# Patient Record
Sex: FEMALE | Race: BLACK OR AFRICAN AMERICAN | NOT HISPANIC OR LATINO | Employment: FULL TIME | URBAN - METROPOLITAN AREA
[De-identification: names, ages, dates, MRNs, and addresses within clinical notes are randomized per-mention and may not be internally consistent; named-entity substitution may affect disease eponyms.]

---

## 2023-06-18 ENCOUNTER — HOSPITAL ENCOUNTER (EMERGENCY)
Facility: HOSPITAL | Age: 23
Discharge: HOME | End: 2023-06-18
Attending: EMERGENCY MEDICINE | Admitting: EMERGENCY MEDICINE
Payer: COMMERCIAL

## 2023-06-18 VITALS
WEIGHT: 200 LBS | RESPIRATION RATE: 20 BRPM | BODY MASS INDEX: 35.44 KG/M2 | HEART RATE: 65 BPM | DIASTOLIC BLOOD PRESSURE: 92 MMHG | TEMPERATURE: 98.4 F | HEIGHT: 63 IN | OXYGEN SATURATION: 99 % | SYSTOLIC BLOOD PRESSURE: 126 MMHG

## 2023-06-18 DIAGNOSIS — O03.9 MISCARRIAGE: ICD-10-CM

## 2023-06-18 DIAGNOSIS — N93.9 VAGINAL BLEEDING: ICD-10-CM

## 2023-06-18 LAB
ABO/RH(D): NORMAL
ANTIBODY SCREEN: NEGATIVE
BASOPHILS # BLD AUTO: 0 10E3/UL (ref 0–0.2)
BASOPHILS NFR BLD AUTO: 0 %
EOSINOPHIL # BLD AUTO: 0.1 10E3/UL (ref 0–0.7)
EOSINOPHIL NFR BLD AUTO: 1 %
ERYTHROCYTE [DISTWIDTH] IN BLOOD BY AUTOMATED COUNT: 14.5 % (ref 10–15)
HCG INTACT+B SERPL-ACNC: 1 MIU/ML
HCT VFR BLD AUTO: 37 % (ref 35–47)
HGB BLD-MCNC: 12.5 G/DL (ref 11.7–15.7)
IMM GRANULOCYTES # BLD: 0 10E3/UL
IMM GRANULOCYTES NFR BLD: 0 %
LYMPHOCYTES # BLD AUTO: 2.4 10E3/UL (ref 0.8–5.3)
LYMPHOCYTES NFR BLD AUTO: 37 %
MCH RBC QN AUTO: 26.2 PG (ref 26.5–33)
MCHC RBC AUTO-ENTMCNC: 33.8 G/DL (ref 31.5–36.5)
MCV RBC AUTO: 77 FL (ref 78–100)
MONOCYTES # BLD AUTO: 0.4 10E3/UL (ref 0–1.3)
MONOCYTES NFR BLD AUTO: 6 %
NEUTROPHILS # BLD AUTO: 3.7 10E3/UL (ref 1.6–8.3)
NEUTROPHILS NFR BLD AUTO: 56 %
NRBC # BLD AUTO: 0 10E3/UL
NRBC BLD AUTO-RTO: 0 /100
PLATELET # BLD AUTO: 329 10E3/UL (ref 150–450)
RBC # BLD AUTO: 4.78 10E6/UL (ref 3.8–5.2)
SPECIMEN EXPIRATION DATE: NORMAL
WBC # BLD AUTO: 6.7 10E3/UL (ref 4–11)

## 2023-06-18 PROCEDURE — 86850 RBC ANTIBODY SCREEN: CPT | Performed by: EMERGENCY MEDICINE

## 2023-06-18 PROCEDURE — 86901 BLOOD TYPING SEROLOGIC RH(D): CPT | Performed by: EMERGENCY MEDICINE

## 2023-06-18 PROCEDURE — 36415 COLL VENOUS BLD VENIPUNCTURE: CPT | Performed by: EMERGENCY MEDICINE

## 2023-06-18 PROCEDURE — 85004 AUTOMATED DIFF WBC COUNT: CPT | Performed by: EMERGENCY MEDICINE

## 2023-06-18 PROCEDURE — 84702 CHORIONIC GONADOTROPIN TEST: CPT | Performed by: EMERGENCY MEDICINE

## 2023-06-18 PROCEDURE — 99285 EMERGENCY DEPT VISIT HI MDM: CPT | Mod: 25

## 2023-06-18 PROCEDURE — 99283 EMERGENCY DEPT VISIT LOW MDM: CPT

## 2023-06-18 NOTE — ED TRIAGE NOTES
"Patient c/o cramps, vaginal bleeding \" when I wiped myself \"; patient  stated 5 weeks pregnant approximate.      Triage Assessment     Row Name 06/18/23 1151       Triage Assessment (Adult)    Airway WDL WDL       Respiratory WDL    Respiratory WDL WDL       Skin Circulation/Temperature WDL    Skin Circulation/Temperature WDL WDL       Cardiac WDL    Cardiac WDL WDL       Peripheral/Neurovascular WDL    Peripheral Neurovascular WDL WDL       Cognitive/Neuro/Behavioral WDL    Cognitive/Neuro/Behavioral WDL WDL              "

## 2023-06-18 NOTE — ED PROVIDER NOTES
EMERGENCY DEPARTMENT ENCOUNTER      NAME: Noam Sood  AGE: 23 year old female  YOB: 2000  MRN: [id number]  EVALUATION DATE & TIME: No admission date for patient encounter.    PCP: No primary care provider on file.    ED PROVIDER: Renetta Branch M.D.      Chief Complaint   Patient presents with     Vaginal Bleeding - Pregnant     FINAL IMPRESSION:  1. Miscarriage    2. Vaginal bleeding      ED COURSE & MEDICAL DECISION MAKING:    Pertinent Labs & Imaging studies reviewed. (See chart for details)  ED Course as of 06/18/23 1508   Sun Jun 18, 2023   1218 Patient is a pleasant 23-year-old female who is currently about 5 and half weeks pregnant and presents today for evaluation of a little bit of vaginal bleeding and small amount of cramping.  She was seen on Thursday and was diagnosed at 5 weeks and 2 days based on her LMP.  This is her second pregnancy.  Her first pregnancy was a daughter that was born in November 2021 and there was no issues with that pregnancy.  He went to the bathroom while at work today and noticed some bright red blood with wiping.  She is denied any tissue or clot passing.  She has a little bit of cramping.  She had some nausea and vomiting with the pregnancy but is normally pretty healthy.  She has an unknown blood type.  She is in no acute distress.  Abdomen is soft and nontender.  She has not had an ultrasound in this pregnancy.  She just went to clinic and they confirmed that she was pregnant.  She does have a family practice doctor that she is not to follow this pregnancy with.  We will get an ultrasound and blood type and quantitative hCG and hemoglobin on her and then reevaluate.  She is in agreement with the plan.   1323 Patient's quant came back at 1.  She has no tenderness on either side.  I think she had a miscarriage if she did have a positive pregnancy test earlier.  I will discuss this with her and we will get her discharged home.   1341 I discussed results and  plan for discharge with the patient.  She is in agreement.  She will be discharged home shortly.     12:08 PM I met with patient for initial interview and encounter. We also discussed the plan for treatment and diagnostic interventions.   1:26 PM Updated patient on diagnostic findings. We also discussed the plan for discharge. They are agreeable and comfortable with plan.    Medical Decision Making    History:    Supplemental history from: N/A    External Record(s) reviewed: Reviewed the telephone note from the nurse triage from family medicine today.  They recommended that she come in and get evaluated for this vaginal bleeding in early pregnancy.    Work Up:    Emergent/Severe conditions considered and evaluated for: Ectopic pregnancy, miscarriage    I independently reviewed and interpreted none    In additional to work up documented, I considered the following work up: Considered ultrasound but once patient's quant came back at 1 I did not feel ultrasound had any utility given the patient has no abdominal tenderness on exam.    Medications given that require intensive monitoring for toxicity: None    External consultation:    Discussion of management with another provider: N/A    Complicating factors:    Care impacted by chronic illness: N/A    Care affected by social determinants of health: N/A    Disposition considerations: Discharge  Prescriptions considered/prescribed: None    At the conclusion of the encounter I discussed  the results of all of the tests and the disposition with patient. All questions were answered. The patient acknowledged understanding and was involved in the decision making regarding the overall care plan.      I discussed with patient the utility, limitations and findings of the exam/interventions/studies done during this visit as well as the list of differential diagnosis and symptoms to monitor/return to ER for.  Additional verbal discharge instructions were provided.     MEDICATIONS  "GIVEN IN THE EMERGENCY:  Medications - No data to display    NEW PRESCRIPTIONS STARTED AT TODAY'S ER VISIT  There are no discharge medications for this patient.    =================================================================    HPI    Triage Note:   Patient c/o cramps, vaginal bleeding \" when I wiped myself \"; patient  stated 5 weeks pregnant approximate.      Triage Assessment     Row Name 06/18/23 1151       Triage Assessment (Adult)    Airway WDL WDL       Respiratory WDL    Respiratory WDL WDL       Skin Circulation/Temperature WDL    Skin Circulation/Temperature WDL WDL       Cardiac WDL    Cardiac WDL WDL       Peripheral/Neurovascular WDL    Peripheral Neurovascular WDL WDL       Cognitive/Neuro/Behavioral WDL    Cognitive/Neuro/Behavioral WDL WDL              Patient information was obtained from: Patient    Use of : N/A    Naom Sood is a 23 year old female who presents to the ED for evaluation of vaginal bleeding, abdominal pain.    Patient noted \"a lot\" of bright red blood without clots after she wiped earlier today. She states that she is approximately 5 weeks and 5 days pregnant after her \"Face-to-Face\" appointment on Thursday. They did not do an ultrasound at that time, so she expresses concern for a possible miscarriage. At present, patient complains of mild abdominal cramping that has been waxing and waning all day and nausea with intermittent vomiting at baseline.     Of note, she was this is the patient's 2nd pregnancy in which the first one was without any complications (first child born on 11/6/21).     REVIEW OF SYSTEMS   Except as stated in the HPI all other systems reviewed and are negative.    PAST MEDICAL HISTORY:  No past medical history on file.    PAST SURGICAL HISTORY:  No past surgical history on file.    CURRENT MEDICATIONS:    No current facility-administered medications for this encounter.  No current outpatient medications on file.    ALLERGIES:  No Known " "Allergies    FAMILY HISTORY:  No family history on file.    SOCIAL HISTORY:   No social history on file.     PHYSICAL EXAM    VITAL SIGNS: BP (!) 126/92   Pulse 65   Temp 98.4  F (36.9  C) (Oral)   Resp 20   Ht 1.6 m (5' 3\")   Wt 90.7 kg (200 lb)   SpO2 99%   BMI 35.43 kg/m     GENERAL: Awake, alert, answering questions appropriately, no acute distress  SPEECH:  Easy to understand speech, Normal volume and tano  PULMONARY: No respiratory distress, Lungs clear to auscultation bilaterally  CARDIOVASCULAR: Regular rate and rhythm, Distal pulses present and normal.  ABDOMINAL: Soft, Nondistended, Nontender, No rebound or guarding, No palpable masses  EXTREMITIES: No lower extremity edema.  PSYCH: Normal mood and affect     LAB:  All pertinent labs reviewed and interpreted.  Results for orders placed or performed during the hospital encounter of 06/18/23   HCG quantitative pregnancy (blood)   Result Value Ref Range    hCG Quantitative 1 <5 mIU/mL   CBC with platelets and differential   Result Value Ref Range    WBC Count 6.7 4.0 - 11.0 10e3/uL    RBC Count 4.78 3.80 - 5.20 10e6/uL    Hemoglobin 12.5 11.7 - 15.7 g/dL    Hematocrit 37.0 35.0 - 47.0 %    MCV 77 (L) 78 - 100 fL    MCH 26.2 (L) 26.5 - 33.0 pg    MCHC 33.8 31.5 - 36.5 g/dL    RDW 14.5 10.0 - 15.0 %    Platelet Count 329 150 - 450 10e3/uL    % Neutrophils 56 %    % Lymphocytes 37 %    % Monocytes 6 %    % Eosinophils 1 %    % Basophils 0 %    % Immature Granulocytes 0 %    NRBCs per 100 WBC 0 <1 /100    Absolute Neutrophils 3.7 1.6 - 8.3 10e3/uL    Absolute Lymphocytes 2.4 0.8 - 5.3 10e3/uL    Absolute Monocytes 0.4 0.0 - 1.3 10e3/uL    Absolute Eosinophils 0.1 0.0 - 0.7 10e3/uL    Absolute Basophils 0.0 0.0 - 0.2 10e3/uL    Absolute Immature Granulocytes 0.0 <=0.4 10e3/uL    Absolute NRBCs 0.0 10e3/uL   Adult Type and Screen   Result Value Ref Range    ABO/RH(D) O POS     Antibody Screen Negative Negative    SPECIMEN EXPIRATION DATE [id number]  "     I, David Dowd, am serving as a scribe to document services personally performed by Dr. Branch based on my observation and the provider's statements to me. I, Renetta Branch MD attest that David Dowd is acting in a scribe capacity, has observed my performance of the services and has documented them in accordance with my direction.    Renetta Branch M.D.  Emergency Medicine  [facility] EMERGENCY DEPARTMENT  [facility] [facility] [id number] [phone]  Dept: [phone]     Renetta Branch MD  06/18/23 1420

## 2023-06-18 NOTE — Clinical Note
Noam Sood was seen and treated in our emergency department on 6/18/2023.         Sincerely,     [facility] Emergency Department

## 2023-06-18 NOTE — DISCHARGE INSTRUCTIONS
You were seen today for vaginal bleeding and found to have had a miscarriage.  Your pregnancy test today was negative despite being positive several days ago when you were in clinic.  You may have some more bleeding over the next few days.

## 2023-10-29 ENCOUNTER — APPOINTMENT (OUTPATIENT)
Dept: ULTRASOUND IMAGING | Facility: HOSPITAL | Age: 23
End: 2023-10-29
Payer: COMMERCIAL

## 2023-10-29 ENCOUNTER — HOSPITAL ENCOUNTER (EMERGENCY)
Facility: HOSPITAL | Age: 23
Discharge: HOME | End: 2023-10-29
Attending: EMERGENCY MEDICINE | Admitting: EMERGENCY MEDICINE
Payer: COMMERCIAL

## 2023-10-29 VITALS
HEART RATE: 76 BPM | WEIGHT: 267.2 LBS | TEMPERATURE: 98.4 F | HEIGHT: 63 IN | OXYGEN SATURATION: 98 % | DIASTOLIC BLOOD PRESSURE: 67 MMHG | RESPIRATION RATE: 20 BRPM | SYSTOLIC BLOOD PRESSURE: 131 MMHG | BODY MASS INDEX: 47.34 KG/M2
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
## 2023-10-29 DIAGNOSIS — R10.9 ABDOMINAL PAIN DURING PREGNANCY IN FIRST TRIMESTER: ICD-10-CM

## 2023-10-29 DIAGNOSIS — O26.891 ABDOMINAL PAIN DURING PREGNANCY IN FIRST TRIMESTER: ICD-10-CM

## 2023-10-29 PROCEDURE — 76815 OB US LIMITED FETUS(S): CPT

## 2023-10-29 PROCEDURE — 99284 EMERGENCY DEPT VISIT MOD MDM: CPT | Mod: 25

## 2023-10-29 ASSESSMENT — ACTIVITIES OF DAILY LIVING (ADL): ADLS_ACUITY_SCORE: 35

## 2023-10-30 NOTE — DISCHARGE INSTRUCTIONS
You were seen in the emergency department today for abdominal pain after a fall at home. Your ultrasound was reassuring. Please follow up with your OB clinician as soon as possible for recheck. Please return to the emergency department if you develop any abdominal pain, vaginal bleeding, vaginal discharge, or confusion.

## 2023-10-30 NOTE — ED PROVIDER NOTES
EMERGENCY DEPARTMENT ENCOUNTER      NAME: Noam Sood  AGE: 23 year old female  YOB: 2000  MRN: [id number]  EVALUATION DATE & TIME: 10/29/23 7:17 PM    PCP: No Ref-Primary, Physician    ED PROVIDER: Zuleyma King PA-C      CHIEF COMPLAINT:  Fall      FINAL IMPRESSION:  1. Fall from slip, trip, or stumble, initial encounter    2. Abdominal pain during pregnancy in first trimester          ED COURSE & MEDICAL DECISION MAKING:  Pertinent Labs & Imaging studies reviewed. (See chart for details)    MDM: The patient is a 23 year old female who is currently 15 weeks pregnant , O+ blood type who presents to the Emergency Department for evaluation of trip over a toy a standing on the bottom stair falling onto hardwood floor at home and abdominal pain.  No head impact, although she does note minimal neck pain.    Initial vitals reviewed and within normal limits. On exam, she has some periumbilical tenderness to palpation. Reassuringly, she has full active range of motion of her neck and no bony midline C-spine tenderness to palpation.  No tenderness to palpation cervical musculature.    Differential diagnosis includes placental abruption, musculoskeletal abdominal wall pain, cervical musculoskeletal strain.    OB ultrasound was obtained which was reassuring.  Using shared decision making, did discuss obtaining x-ray of the neck.  At this time, the patient defers imaging of her neck which I do think is reasonable given her reassuring examination.  Symptoms and work up most consistent with fall and abdominal pain.     Plan for discharge to home in stable condition with close follow up with her OB clinician as an outpatient. The patient verbalized understanding and is in agreement with this plan. Emphasized importance of close follow up with their OB clinician. Strict return precautions discussed.      Medical Decision Making    History:  Supplemental history from: Documented in chart, if  applicable  External Record(s) reviewed: Documented in chart, if applicable.    Work Up:  Chart documentation includes differential considered and any EKGs or imaging independently interpreted by provider, where specified.  In additional to work up documented, I considered the following work up: Documented in chart, if applicable.    External consultation:  Discussion of management with another provider: Documented in chart, if applicable    Complicating factors:  Care impacted by chronic illness: N/A  Care affected by social determinants of health: N/A    Disposition considerations: Discharge. No recommendations on prescription strength medication(s). N/A.      ED COURSE:  ED Course as of 10/29/23 2121   Sun Oct 29, 2023   1950 I discussed the case with Zuleyma King PA-C.   2050 Patient is a pleasant 23-year-old female who comes in today for evaluation after she had a fall 2 hours prior to arrival when she tripped on a toy.  She landed on her belly and was complaining of some abdominal pain.  She is not having any active cramping or vaginal bleeding.  She is currently 19 weeks pregnant.  She is here with her daughter.  She has no tenderness on my exam.  Ultrasound came back unremarkable.  I discussed this with the patient.  We discussed return precautions.  I think she is okay to go home now.  She is comfortable with the plan will be discharged shortly.       7:23 PM I met and introduced myself to the patient. I gathered initial history and performed an initial physical exam. We discussed options and plan for diagnostics and treatment here in the ED.  7:50 PM I have staffed the patient with Dr. Renetta Branch, ED physician, who has evaluated the patient and agrees with all aspects of today's care.     At the conclusion of the encounter I discussed the results of all the tests and the disposition. The questions were answered to the best of my ability. The patient and/or family acknowledged understanding and  "was agreeable with the care plan.          MEDICATIONS GIVEN IN THE EMERGENCY:  Medications - No data to display    NEW PRESCRIPTIONS STARTED AT TODAY'S ER VISIT  There are no discharge medications for this patient.         =================================================================    HPI    Patient information was obtained from: the patient     Use of Intrepreter: N/A        Noam Sood is a 23 year old female with pertinent medical history of currently 15 weeks pregnant , O+ blood type who presents to the emergency department for evaluation of fall and abdominal pain.    The patient was walking down the stairs this evening when she was on the last stair of the staircase she tripped over a toy and fell forward landing on her abdomen on hardwood floor.  No head impact.  She then developed mid abdominal pain.  She denies vaginal bleeding or discharge.  She has felt fetal movement already in the pregnancy and has not felt as much fetal movement as usual since falling.  She also endorses some mild neck pain and states that she is not very concerned about her neck pain.  No other associated injury or symptoms secondary to the fall.  She is O+ blood type.      PAST MEDICAL HISTORY:  History reviewed. No pertinent past medical history.    PAST SURGICAL HISTORY:  History reviewed. No pertinent surgical history.    CURRENT MEDICATIONS:    None       ALLERGIES:  No Known Allergies    FAMILY HISTORY:  History reviewed. No pertinent family history.    SOCIAL HISTORY:        VITALS:    First Vitals:  Patient Vitals for the past 24 hrs:   BP Temp Temp src Pulse Resp SpO2 Height Weight   10/29/23 2101 131/67 -- -- 76 -- 98 % -- --   10/29/23 1910 115/76 98.4  F (36.9  C) Oral 87 20 98 % 1.6 m (5' 3\") 121.2 kg (267 lb 3.2 oz)       Patient Vitals for the past 24 hrs:   BP Temp Temp src Pulse Resp SpO2 Height Weight   10/29/23 2101 131/67 -- -- 76 -- 98 % -- --   10/29/23 1910 115/76 98.4  F (36.9  C) Oral 87 20 98 % " "1.6 m (5' 3\") 121.2 kg (267 lb 3.2 oz)       PHYSICAL EXAM  VITAL SIGNS: /67   Pulse 76   Temp 98.4  F (36.9  C) (Oral)   Resp 20   Ht 1.6 m (5' 3\")   Wt 121.2 kg (267 lb 3.2 oz)   SpO2 98%   BMI 47.33 kg/m     GENERAL: Awake, alert, answering questions appropriately, resting comfortably in exam chair in no acute distress.  HEENT: No C-spine bony midline tenderness.  She has full active range of motion of her neck.  No paraspinal tenderness to palpation of the cervical region.  SPEECH:  Easy to understand speech, Normal volume and tano.  PULMONARY: No respiratory distress, Breathing comfortably on room air. Lungs clear to auscultation bilaterally.  CARDIOVASCULAR: Regular rate and rhythm, radial pulses present, symmetric, and normal.  ABDOMINAL: Soft, Nondistended, mild tenderness to palpation periumbilical region, No rebound or guarding, obese abdomen.  NEUROLOGIC: Moving all extremities spontaneously.   SKIN: Exposed areas of skin warm, dry, no rashes.  PSYCH: Normal mood and affect.         RADIOLOGY/LAB:  Reviewed all pertinent imaging. Please see official radiology report.  All pertinent labs reviewed and interpreted.  Results for orders placed or performed during the hospital encounter of 10/29/23   US OB >14 Weeks Limited wo Fetal Measurement    Impression    IMPRESSION:  1.  Single living intrauterine gestation, cephalic presentation. Fetal heart rate of 152 bpm. No acute findings.                   Zuleyma King PA-C  Emergency Medicine  [facility] EMERGENCY DEPARTMENT  [facility] [facility] [id number] [phone]  Dept: [phone]     Zuleyma King PA-C  10/29/23 2122    "

## 2023-10-30 NOTE — ED NOTES
I, Renetta Branch MD, have reviewed the documentation, personally taken the patient's history, performed an exam and agree with the physical finds, diagnosis and management plan.  I saw this patient with Zuleyma King PA-C.  ED Course as of 10/29/23 2106   Sun Oct 29, 2023   1950 I discussed the case with Zuleyma King PA-C.   2050 Patient is a pleasant 23-year-old female who comes in today for evaluation after she had a fall 2 hours prior to arrival when she tripped on a toy.  She landed on her belly and was complaining of some abdominal pain.  She is not having any active cramping or vaginal bleeding.  She is currently 19 weeks pregnant.  She is here with her daughter.  She has no tenderness on my exam.  Ultrasound came back unremarkable.  I discussed this with the patient.  We discussed return precautions.  I think she is okay to go home now.  She is comfortable with the plan will be discharged shortly.       I personally saw the patient and performed a substantive portion of the visit including all aspects of the medical decision making.     Renetta Branch MD  10/29/23 2106

## 2023-10-30 NOTE — ED TRIAGE NOTES
Amb to triage.  Pt states fell about 2 hours ago when tripped on toy.  Pt c/o pain in middle of abd.  Denies vag bleeding or discharge.  Pt is 19wks preg.       Triage Assessment (Adult)       Row Name 10/29/23 1911          Triage Assessment    Airway WDL WDL        Respiratory WDL    Respiratory WDL WDL        Skin Circulation/Temperature WDL    Skin Circulation/Temperature WDL WDL        Cardiac WDL    Cardiac WDL WDL        Peripheral/Neurovascular WDL    Peripheral Neurovascular WDL WDL        Cognitive/Neuro/Behavioral WDL    Cognitive/Neuro/Behavioral WDL WDL